# Patient Record
Sex: MALE | Race: WHITE | NOT HISPANIC OR LATINO | Employment: OTHER | URBAN - METROPOLITAN AREA
[De-identification: names, ages, dates, MRNs, and addresses within clinical notes are randomized per-mention and may not be internally consistent; named-entity substitution may affect disease eponyms.]

---

## 2018-02-09 ENCOUNTER — ESTABLISHED COMPREHENSIVE EXAM (OUTPATIENT)
Dept: URBAN - METROPOLITAN AREA CLINIC 39 | Facility: CLINIC | Age: 48
End: 2018-02-09

## 2018-02-09 DIAGNOSIS — H43.813: ICD-10-CM

## 2018-02-09 DIAGNOSIS — H04.123: ICD-10-CM

## 2018-02-09 DIAGNOSIS — H35.413: ICD-10-CM

## 2018-02-09 DIAGNOSIS — H40.1130: ICD-10-CM

## 2018-02-09 PROCEDURE — 92014 COMPRE OPH EXAM EST PT 1/>: CPT

## 2018-02-09 PROCEDURE — 92133 CPTRZD OPH DX IMG PST SGM ON: CPT

## 2018-02-09 ASSESSMENT — VISUAL ACUITY
OS_CC: 20/40-2
OD_SC: CF 3FT
OS_SC: CF 3FT
OU_SC: CF 3FT
OU_CC: 20/25
OD_CC: 20/30-2

## 2018-02-09 ASSESSMENT — TONOMETRY
OS_IOP_MMHG: 12
OD_IOP_MMHG: 12

## 2018-07-16 ENCOUNTER — IOP CHECK (OUTPATIENT)
Dept: URBAN - METROPOLITAN AREA CLINIC 39 | Facility: CLINIC | Age: 48
End: 2018-07-16

## 2018-07-16 DIAGNOSIS — H52.4: ICD-10-CM

## 2018-07-16 DIAGNOSIS — H40.1130: ICD-10-CM

## 2018-07-16 PROCEDURE — 92012 INTRM OPH EXAM EST PATIENT: CPT

## 2018-07-16 PROCEDURE — 92310-1 LEVEL 1 CONTACT LENS MANAGEMENT

## 2018-07-16 PROCEDURE — 92083 EXTENDED VISUAL FIELD XM: CPT

## 2018-07-16 ASSESSMENT — VISUAL ACUITY
OD_CC: J3
OD_CC: 20/40-1
OS_CC: J2
OS_CC: 20/40-1

## 2018-07-16 ASSESSMENT — TONOMETRY
OD_IOP_MMHG: 9
OS_IOP_MMHG: 10

## 2018-08-13 ENCOUNTER — CONTACT LENS FOLLOW UP (OUTPATIENT)
Dept: URBAN - METROPOLITAN AREA CLINIC 39 | Facility: CLINIC | Age: 48
End: 2018-08-13

## 2018-08-13 DIAGNOSIS — H52.203: ICD-10-CM

## 2018-08-13 DIAGNOSIS — H43.813: ICD-10-CM

## 2018-08-13 DIAGNOSIS — H35.413: ICD-10-CM

## 2018-08-13 DIAGNOSIS — H04.123: ICD-10-CM

## 2018-08-13 DIAGNOSIS — H40.1130: ICD-10-CM

## 2018-08-13 PROCEDURE — 92310F

## 2018-08-13 ASSESSMENT — VISUAL ACUITY
OD_CC: 20/25
OS_CC: 20/50-1

## 2018-09-19 ENCOUNTER — CONTACT LENS FOLLOW UP (OUTPATIENT)
Dept: URBAN - METROPOLITAN AREA CLINIC 39 | Facility: CLINIC | Age: 48
End: 2018-09-19

## 2018-09-19 DIAGNOSIS — H52.203: ICD-10-CM

## 2018-09-19 DIAGNOSIS — H52.4: ICD-10-CM

## 2018-09-19 PROCEDURE — 92310F

## 2018-09-19 ASSESSMENT — VISUAL ACUITY
OD_CC: 20/25+2
OS_CC: J3
OD_CC: J1+
OS_CC: 20/40-1

## 2019-01-21 ENCOUNTER — ESTABLISHED COMPREHENSIVE EXAM (OUTPATIENT)
Dept: URBAN - METROPOLITAN AREA CLINIC 39 | Facility: CLINIC | Age: 49
End: 2019-01-21

## 2019-01-21 DIAGNOSIS — H40.1130: ICD-10-CM

## 2019-01-21 DIAGNOSIS — H43.813: ICD-10-CM

## 2019-01-21 DIAGNOSIS — H04.123: ICD-10-CM

## 2019-01-21 DIAGNOSIS — H35.413: ICD-10-CM

## 2019-01-21 PROCEDURE — 92014 COMPRE OPH EXAM EST PT 1/>: CPT

## 2019-01-21 PROCEDURE — 92083 EXTENDED VISUAL FIELD XM: CPT

## 2019-01-21 PROCEDURE — 92015 DETERMINE REFRACTIVE STATE: CPT

## 2019-01-21 ASSESSMENT — VISUAL ACUITY
OD_SC: CF 1FT
OS_CC: J2 CL
OD_CC: J1+ CL
OS_SC: CF 1FT

## 2019-01-21 ASSESSMENT — TONOMETRY
OS_IOP_MMHG: 08
OD_IOP_MMHG: 11

## 2019-07-23 ENCOUNTER — IOP CHECK (OUTPATIENT)
Dept: URBAN - METROPOLITAN AREA CLINIC 39 | Facility: CLINIC | Age: 49
End: 2019-07-23

## 2019-07-23 DIAGNOSIS — H40.1130: ICD-10-CM

## 2019-07-23 PROCEDURE — 92133 CPTRZD OPH DX IMG PST SGM ON: CPT

## 2019-07-23 PROCEDURE — 92012 INTRM OPH EXAM EST PATIENT: CPT

## 2019-07-23 ASSESSMENT — TONOMETRY
OS_IOP_MMHG: 11
OD_IOP_MMHG: 10

## 2020-04-13 ENCOUNTER — EST. PATIENT EMERGENCY (OUTPATIENT)
Dept: URBAN - METROPOLITAN AREA CLINIC 39 | Facility: CLINIC | Age: 50
End: 2020-04-13

## 2020-04-13 DIAGNOSIS — H40.1130: ICD-10-CM

## 2020-04-13 PROCEDURE — 92250 FUNDUS PHOTOGRAPHY W/I&R: CPT

## 2020-04-13 PROCEDURE — 92012 INTRM OPH EXAM EST PATIENT: CPT

## 2020-04-13 ASSESSMENT — VISUAL ACUITY
OD_CC: J1
OS_CC: J4
OS_PH: 20/40
OD_CC: 20/30
OS_CC: 20/100

## 2020-04-13 ASSESSMENT — TONOMETRY
OS_IOP_MMHG: 10
OD_IOP_MMHG: 12

## 2020-05-19 ENCOUNTER — CONTACT LENS FOLLOW UP (OUTPATIENT)
Dept: URBAN - METROPOLITAN AREA CLINIC 39 | Facility: CLINIC | Age: 50
End: 2020-05-19

## 2020-05-19 DIAGNOSIS — Z97.3: ICD-10-CM

## 2020-05-19 DIAGNOSIS — H52.13: ICD-10-CM

## 2020-05-19 DIAGNOSIS — H52.203: ICD-10-CM

## 2020-05-19 DIAGNOSIS — H52.4: ICD-10-CM

## 2020-05-19 PROCEDURE — 92310F

## 2020-05-19 ASSESSMENT — VISUAL ACUITY
OU_CC: 20/20-2 CL
OS_CC: J2 CL
OD_CC: 20/25-2 CL
OD_CC: J2 CL
OU_CC: J1+ CL

## 2020-07-23 ENCOUNTER — ESTABLISHED COMPREHENSIVE EXAM (OUTPATIENT)
Dept: URBAN - METROPOLITAN AREA CLINIC 39 | Facility: CLINIC | Age: 50
End: 2020-07-23

## 2020-07-23 DIAGNOSIS — H52.13: ICD-10-CM

## 2020-07-23 PROCEDURE — 92015 DETERMINE REFRACTIVE STATE: CPT

## 2020-07-23 ASSESSMENT — VISUAL ACUITY
OD_SC: CF 3FT
OS_SC: CF 2FT

## 2020-12-16 ENCOUNTER — IOP CHECK (OUTPATIENT)
Dept: URBAN - METROPOLITAN AREA CLINIC 39 | Facility: CLINIC | Age: 50
End: 2020-12-16

## 2020-12-16 DIAGNOSIS — H40.1130: ICD-10-CM

## 2020-12-16 PROCEDURE — 92083 EXTENDED VISUAL FIELD XM: CPT

## 2020-12-16 PROCEDURE — 92012 INTRM OPH EXAM EST PATIENT: CPT

## 2020-12-16 ASSESSMENT — TONOMETRY
OD_IOP_MMHG: 12
OS_IOP_MMHG: 10

## 2020-12-16 ASSESSMENT — VISUAL ACUITY
OU_CC: 20/40-2 CL
OD_CC: 20/40-2 CL

## 2021-06-21 ENCOUNTER — ESTABLISHED COMPREHENSIVE EXAM (OUTPATIENT)
Dept: URBAN - METROPOLITAN AREA CLINIC 40 | Facility: CLINIC | Age: 51
End: 2021-06-21

## 2021-06-21 DIAGNOSIS — H43.813: ICD-10-CM

## 2021-06-21 DIAGNOSIS — H04.123: ICD-10-CM

## 2021-06-21 DIAGNOSIS — H35.413: ICD-10-CM

## 2021-06-21 DIAGNOSIS — H52.13: ICD-10-CM

## 2021-06-21 DIAGNOSIS — H40.1130: ICD-10-CM

## 2021-06-21 DIAGNOSIS — H52.4: ICD-10-CM

## 2021-06-21 PROCEDURE — 92015 DETERMINE REFRACTIVE STATE: CPT

## 2021-06-21 PROCEDURE — 92133 CPTRZD OPH DX IMG PST SGM ON: CPT

## 2021-06-21 PROCEDURE — 92014 COMPRE OPH EXAM EST PT 1/>: CPT

## 2021-06-21 ASSESSMENT — TONOMETRY
OD_IOP_MMHG: 12
OS_IOP_MMHG: 9

## 2021-06-21 ASSESSMENT — VISUAL ACUITY
OU_CC: 20/30+2 CL
OD_CC: 20/25-2
OS_SC: <J12
OS_SC: CF 1FT
OD_SC: <J12
OU_CC: J1+
OD_SC: CF 2FT

## 2021-12-13 ENCOUNTER — FOLLOW UP (OUTPATIENT)
Dept: URBAN - METROPOLITAN AREA CLINIC 40 | Facility: CLINIC | Age: 51
End: 2021-12-13

## 2021-12-13 DIAGNOSIS — H40.1130: ICD-10-CM

## 2021-12-13 PROCEDURE — 92012 INTRM OPH EXAM EST PATIENT: CPT

## 2021-12-13 PROCEDURE — 92083 EXTENDED VISUAL FIELD XM: CPT

## 2021-12-13 ASSESSMENT — TONOMETRY
OD_IOP_MMHG: 10
OS_IOP_MMHG: 8

## 2022-05-02 ENCOUNTER — EMERGENCY VISIT (OUTPATIENT)
Dept: URBAN - METROPOLITAN AREA CLINIC 40 | Facility: CLINIC | Age: 52
End: 2022-05-02

## 2022-05-02 DIAGNOSIS — H00.13: ICD-10-CM

## 2022-05-02 PROCEDURE — 92012 INTRM OPH EXAM EST PATIENT: CPT

## 2022-05-02 ASSESSMENT — VISUAL ACUITY
OS_CC: 20/100 CL
OD_CC: 20/30 CL

## 2022-06-13 ENCOUNTER — COMPREHENSIVE EXAM (OUTPATIENT)
Dept: URBAN - METROPOLITAN AREA CLINIC 40 | Facility: CLINIC | Age: 52
End: 2022-06-13

## 2022-06-13 DIAGNOSIS — H52.13: ICD-10-CM

## 2022-06-13 DIAGNOSIS — H43.813: ICD-10-CM

## 2022-06-13 DIAGNOSIS — H16.223: ICD-10-CM

## 2022-06-13 DIAGNOSIS — H52.203: ICD-10-CM

## 2022-06-13 DIAGNOSIS — H52.4: ICD-10-CM

## 2022-06-13 DIAGNOSIS — H02.403: ICD-10-CM

## 2022-06-13 DIAGNOSIS — Z97.3: ICD-10-CM

## 2022-06-13 DIAGNOSIS — H40.1130: ICD-10-CM

## 2022-06-13 DIAGNOSIS — H35.413: ICD-10-CM

## 2022-06-13 PROCEDURE — 92014 COMPRE OPH EXAM EST PT 1/>: CPT

## 2022-06-13 PROCEDURE — 92015 DETERMINE REFRACTIVE STATE: CPT

## 2022-06-13 ASSESSMENT — VISUAL ACUITY
OU_CC: 20/20 CL
OS_CC: 20/80-1 CL
OD_CC: J1+ CL
OS_SC: <J10
OS_CC: J1+ CL
OU_CC: J1+ CL
OS_SC: CF 2FT
OD_CC: 20/20 CL
OD_SC: CF 3FT
OD_SC: <J10
OU_SC: <J10
OU_SC: CF 5FT

## 2022-06-13 ASSESSMENT — TONOMETRY
OS_IOP_MMHG: 09
OD_IOP_MMHG: 10

## 2022-11-21 ENCOUNTER — FOLLOW UP (OUTPATIENT)
Dept: URBAN - METROPOLITAN AREA CLINIC 40 | Facility: CLINIC | Age: 52
End: 2022-11-21

## 2022-11-21 DIAGNOSIS — H16.223: ICD-10-CM

## 2022-11-21 DIAGNOSIS — H40.1130: ICD-10-CM

## 2022-11-21 PROCEDURE — 92083 EXTENDED VISUAL FIELD XM: CPT

## 2022-11-21 PROCEDURE — 92012 INTRM OPH EXAM EST PATIENT: CPT

## 2022-11-21 PROCEDURE — 92250 FUNDUS PHOTOGRAPHY W/I&R: CPT

## 2022-11-21 ASSESSMENT — VISUAL ACUITY
OS_CC: 20/80 CL
OD_CC: 20/25+2 CL

## 2022-11-21 ASSESSMENT — TONOMETRY
OD_IOP_MMHG: 13
OS_IOP_MMHG: 12

## 2023-05-22 ENCOUNTER — COMPREHENSIVE EXAM (OUTPATIENT)
Dept: URBAN - METROPOLITAN AREA CLINIC 40 | Facility: CLINIC | Age: 53
End: 2023-05-22

## 2023-05-22 DIAGNOSIS — H52.203: ICD-10-CM

## 2023-05-22 DIAGNOSIS — H16.223: ICD-10-CM

## 2023-05-22 DIAGNOSIS — H35.413: ICD-10-CM

## 2023-05-22 DIAGNOSIS — H43.813: ICD-10-CM

## 2023-05-22 DIAGNOSIS — H02.403: ICD-10-CM

## 2023-05-22 DIAGNOSIS — H52.4: ICD-10-CM

## 2023-05-22 DIAGNOSIS — H40.1130: ICD-10-CM

## 2023-05-22 DIAGNOSIS — H52.13: ICD-10-CM

## 2023-05-22 PROCEDURE — 92133 CPTRZD OPH DX IMG PST SGM ON: CPT

## 2023-05-22 PROCEDURE — 92014 COMPRE OPH EXAM EST PT 1/>: CPT

## 2023-05-22 PROCEDURE — 92015 DETERMINE REFRACTIVE STATE: CPT

## 2023-05-22 ASSESSMENT — VISUAL ACUITY
OS_CC: J1+ CL
OD_CC: J2 CL
OU_CC: 20/25 CL
OD_CC: 20/25 CL
OS_CC: 20/60-1 CL
OU_CC: J1+ CL

## 2023-05-22 ASSESSMENT — TONOMETRY
OD_IOP_MMHG: 13
OS_IOP_MMHG: 12

## 2023-11-20 ENCOUNTER — FOLLOW UP (OUTPATIENT)
Dept: URBAN - METROPOLITAN AREA CLINIC 40 | Facility: CLINIC | Age: 53
End: 2023-11-20

## 2023-11-20 DIAGNOSIS — H40.1130: ICD-10-CM

## 2023-11-20 DIAGNOSIS — H35.413: ICD-10-CM

## 2023-11-20 DIAGNOSIS — H16.223: ICD-10-CM

## 2023-11-20 DIAGNOSIS — H02.403: ICD-10-CM

## 2023-11-20 DIAGNOSIS — H43.813: ICD-10-CM

## 2023-11-20 PROCEDURE — 92012 INTRM OPH EXAM EST PATIENT: CPT

## 2023-11-20 PROCEDURE — 92083 EXTENDED VISUAL FIELD XM: CPT

## 2023-11-20 ASSESSMENT — VISUAL ACUITY
OS_PH: 20/25 CL
OD_CC: 20/30-1 CL
OS_CC: 20/60-1 CL

## 2023-11-20 ASSESSMENT — TONOMETRY
OS_IOP_MMHG: 15
OD_IOP_MMHG: 14

## 2023-12-06 ENCOUNTER — CONSULTATION/EVALUATION (OUTPATIENT)
Dept: URBAN - METROPOLITAN AREA CLINIC 39 | Facility: CLINIC | Age: 53
End: 2023-12-06

## 2023-12-06 DIAGNOSIS — H40.1113: ICD-10-CM

## 2023-12-06 DIAGNOSIS — H40.1121: ICD-10-CM

## 2023-12-06 PROCEDURE — 92250 FUNDUS PHOTOGRAPHY W/I&R: CPT

## 2023-12-06 PROCEDURE — 6585550 LASER TRABECULOPLASTY

## 2023-12-06 PROCEDURE — 76514 ECHO EXAM OF EYE THICKNESS: CPT

## 2023-12-06 PROCEDURE — 92020 GONIOSCOPY: CPT

## 2023-12-06 PROCEDURE — 99204 OFFICE O/P NEW MOD 45 MIN: CPT

## 2023-12-06 RX ORDER — LOTEPREDNOL ETABONATE 3.8 MG/G: 1 GEL OPHTHALMIC

## 2023-12-06 ASSESSMENT — PACHYMETRY
OS_CT_UM: 506
OD_CT_UM: 531

## 2023-12-06 ASSESSMENT — VISUAL ACUITY
OS_CC: J2
OD_CC: J1
OS_PH: 20/30

## 2023-12-06 ASSESSMENT — TONOMETRY
OD_IOP_MMHG: 12
OS_IOP_MMHG: 09

## 2024-01-03 ENCOUNTER — FOLLOW UP (OUTPATIENT)
Dept: URBAN - METROPOLITAN AREA CLINIC 39 | Facility: CLINIC | Age: 54
End: 2024-01-03

## 2024-01-03 DIAGNOSIS — H40.1130: ICD-10-CM

## 2024-01-03 PROCEDURE — 92012 INTRM OPH EXAM EST PATIENT: CPT

## 2024-01-03 ASSESSMENT — TONOMETRY
OS_IOP_MMHG: 08
OD_IOP_MMHG: 10

## 2024-01-03 ASSESSMENT — VISUAL ACUITY
OS_PH: 20/50-1
OD_CC: 20/40-2 CL
OS_CC: 20/60-1 CL
OD_PH: 20/30-1

## 2024-05-22 ENCOUNTER — COMPREHENSIVE EXAM (OUTPATIENT)
Dept: URBAN - METROPOLITAN AREA CLINIC 39 | Facility: CLINIC | Age: 54
End: 2024-05-22

## 2024-05-22 DIAGNOSIS — H52.13: ICD-10-CM

## 2024-05-22 DIAGNOSIS — H40.1130: ICD-10-CM

## 2024-05-22 DIAGNOSIS — Z97.3: ICD-10-CM

## 2024-05-22 DIAGNOSIS — H43.813: ICD-10-CM

## 2024-05-22 DIAGNOSIS — H16.223: ICD-10-CM

## 2024-05-22 DIAGNOSIS — H52.203: ICD-10-CM

## 2024-05-22 DIAGNOSIS — H02.403: ICD-10-CM

## 2024-05-22 DIAGNOSIS — H52.4: ICD-10-CM

## 2024-05-22 DIAGNOSIS — H35.413: ICD-10-CM

## 2024-05-22 PROCEDURE — 92014 COMPRE OPH EXAM EST PT 1/>: CPT

## 2024-05-22 PROCEDURE — 92015 DETERMINE REFRACTIVE STATE: CPT

## 2024-05-22 ASSESSMENT — VISUAL ACUITY
OD_SC: CF 3FT
OS_CC: J1 CL
OU_CC: 20/30-1
OS_CC: 20/60 CL
OS_SC: CF 3FT
OS_SC: J1+
OD_CC: J2 CL
OD_SC: J1+

## 2024-05-22 ASSESSMENT — TONOMETRY
OS_IOP_MMHG: 11
OD_IOP_MMHG: 12

## 2024-09-03 ENCOUNTER — FOLLOW UP (OUTPATIENT)
Dept: URBAN - METROPOLITAN AREA CLINIC 39 | Facility: CLINIC | Age: 54
End: 2024-09-03

## 2024-09-03 DIAGNOSIS — H40.1130: ICD-10-CM

## 2024-09-03 PROCEDURE — 92083 EXTENDED VISUAL FIELD XM: CPT | Mod: 25

## 2024-09-03 PROCEDURE — 92012 INTRM OPH EXAM EST PATIENT: CPT

## 2025-02-14 ENCOUNTER — FOLLOW UP (OUTPATIENT)
Age: 55
End: 2025-02-14

## 2025-02-14 DIAGNOSIS — H40.1130: ICD-10-CM

## 2025-02-14 PROCEDURE — 92012 INTRM OPH EXAM EST PATIENT: CPT

## 2025-08-08 ENCOUNTER — COMPREHENSIVE EXAM (OUTPATIENT)
Age: 55
End: 2025-08-08

## 2025-08-08 DIAGNOSIS — H52.13: ICD-10-CM

## 2025-08-08 DIAGNOSIS — H02.403: ICD-10-CM

## 2025-08-08 DIAGNOSIS — H52.4: ICD-10-CM

## 2025-08-08 DIAGNOSIS — H43.813: ICD-10-CM

## 2025-08-08 DIAGNOSIS — H52.203: ICD-10-CM

## 2025-08-08 DIAGNOSIS — H40.1130: ICD-10-CM

## 2025-08-08 DIAGNOSIS — H16.223: ICD-10-CM

## 2025-08-08 DIAGNOSIS — H35.413: ICD-10-CM

## 2025-08-08 PROCEDURE — 92133 CPTRZD OPH DX IMG PST SGM ON: CPT | Mod: 25

## 2025-08-08 PROCEDURE — 92014 COMPRE OPH EXAM EST PT 1/>: CPT

## 2025-08-08 PROCEDURE — 92015 DETERMINE REFRACTIVE STATE: CPT
